# Patient Record
(demographics unavailable — no encounter records)

---

## 2025-01-15 NOTE — HISTORY OF PRESENT ILLNESS
[FreeTextEntry1] : Ms. Bess is an 82-year-old female presenting with right knee pain. She has a chronic history of right knee pain. She has undergone multiple injections, steroid injections along with viscosupplementation shots all with no sustained relief. She only had relief from about 1 week. She is being referred today for genicular nerve blocks. She continues today with severe right knee pain. Pain is made worse with any weight bearing. She cannot stand or walk without severe pain. She has difficulty walking up or down stairs due to the pain. Pain is present daily. She has limitation of her ADLs. She does also have left knee however her pain is in the posterior aspect of the knee. She uses a cane to ambulate. She has limitation of her ADLs.   TODAY, 6-4-2024: Revisit encounter.   Since her last visit, she underwent a Bilateral genicular nerve block on 5-. She notes about 50% sustained relief from this procedure. She states her right side is more severe and has some worsening pain as opposed to the left. She notes pain with any weight bearing activity. She has stiffness along with a constant dull/aching pain. Pain is present daily. She continues to use a walker to ambulate.    TODAY, 6-: Revisit encounter. She is accompanied by her daughter today.   Since her last visit, she is status post second bilateral genicular nerve block on 6-. She continues with 50% sustained relief from this procedure. Her left knee is more improved than her right knee. She only feels some mild stiffness now. She has been able to ambulate better and go up and down the stairs better. Pain is intermittent. She currently rates the pain anywhere from a 2 to a 4/10 on the pain scale.   9-: Revisit encounter. She is accompanied by her daughter today.   She is presenting with ongoing pain in her bilateral knees. To recap, she underwent 2 bilateral genicular nerve blocks with about 50% sustained relief for 2 days. She was able to function better and had decrease in pain. Her pain then returned to baseline after 2 weeks with both procedures. Today, the pain continues to be bothersome. Pain is the most severe around the medial aspect of the knee. Pain is made worse with weight bearing especially when getting in and out of the car or going up and down the stairs. She continues to use a cane to ambulate. Pain is present daily.   1-: Revisit encounter. She is accompanied by her daughter today.   She is presenting today with worsening pain in the right knee. Her pain is in the neck and referring into the shin and into the knee. She has significant tenderness to the touch over the knee. She cannot stand or walk for more than 5 minutes before she has to sit down to ease the pain. She describes the pain as sharp, shooting, stabbing, stiffness, aching and throbbing. She continues to use a walker to ambulate. She rates the pain at an 8/10 on the pain scale. Her pain is present daily. To recap, she underwent a bilateral knee genicular radiofrequency ablation in October which only provided her with 2 months' worth of relief.

## 2025-01-15 NOTE — PHYSICAL EXAM
[de-identified] : R knee  No rashes, erythema, mild edema noted   TTP at medial joint line Stability: no gross instability  ROM: Painful ROM with flexion Gait: cautious, antalgic   L knee  No rashes, erythema, mild edema noted   TTP at medial joint line Stability: no gross instability  ROM: Painful ROM with flexion Gait: cautious, antalgic

## 2025-01-15 NOTE — DISCUSSION/SUMMARY
[de-identified] : A discussion regarding available pain management treatment options occurred with the patient. These included interventional, rehabilitative, pharmacological, and alternative modalities. We will proceed with the following:   Interventional treatment options: - None indicated at this time.   Medications: 1. Ordered Acetaminophen-Codeine 300-30 mg q8h prn for severe pain.   ISTOP Checked Reference # [          ]  The R/B/A of chronic opiate therapy for non-malignant pain including, but not limited to, the risk of addiction, overdose, respiratory depression, and death was discussed and accepted by the patient. Patient was also informed that they should not consume alcohol or drive a motor vehicle under any circumstances while taking opiate pain medications.  The patient reports good pain control with their current medication regimen. They deny any intolerable side effects. There is no obvious aberrant behavior. The patient is more functional with regard to his ADLs and social activity as result of their current medication regimen.   The patient has reviewed and signed an opioid agreement delineating the use of opioid pain medications within our practice. All questions answered to patient's satisfaction.  - Follow up in 4 weeks for medication refill. I will also have her scheduled to see Dr. Byron Yuan to discuss total knee replacement.   I Lali Nicole attest that this documentation has been prepared under the direction and in the presence of provider Dr. Eduardo Garcia.  The documentation recorded by the scribe in my presence, accurately reflects the service I personally performed, and the decisions made by me with my edits as appropriate.   Eduardo Garcia, DO

## 2025-01-15 NOTE — DISCUSSION/SUMMARY
[de-identified] : A discussion regarding available pain management treatment options occurred with the patient. These included interventional, rehabilitative, pharmacological, and alternative modalities. We will proceed with the following:   Interventional treatment options: - None indicated at this time.   Medications: 1. Ordered Acetaminophen-Codeine 300-30 mg q8h prn for severe pain.   ISTOP Checked Reference # [          ]  The R/B/A of chronic opiate therapy for non-malignant pain including, but not limited to, the risk of addiction, overdose, respiratory depression, and death was discussed and accepted by the patient. Patient was also informed that they should not consume alcohol or drive a motor vehicle under any circumstances while taking opiate pain medications.  The patient reports good pain control with their current medication regimen. They deny any intolerable side effects. There is no obvious aberrant behavior. The patient is more functional with regard to his ADLs and social activity as result of their current medication regimen.   The patient has reviewed and signed an opioid agreement delineating the use of opioid pain medications within our practice. All questions answered to patient's satisfaction.  - Follow up in 4 weeks for medication refill. I will also have her scheduled to see Dr. Byron Yuan to discuss total knee replacement.   I Lali Nicole attest that this documentation has been prepared under the direction and in the presence of provider Dr. Eduardo Garcia.  The documentation recorded by the scribe in my presence, accurately reflects the service I personally performed, and the decisions made by me with my edits as appropriate.   Eduardo Garcia, DO

## 2025-01-15 NOTE — PHYSICAL EXAM
[de-identified] : R knee  No rashes, erythema, mild edema noted   TTP at medial joint line Stability: no gross instability  ROM: Painful ROM with flexion Gait: cautious, antalgic   L knee  No rashes, erythema, mild edema noted   TTP at medial joint line Stability: no gross instability  ROM: Painful ROM with flexion Gait: cautious, antalgic

## 2025-03-05 NOTE — PROCEDURE
[Large Joint Injection] : Large joint injection [Right] : of the right [Knee] : knee [Pain] : pain [X-ray evidence of Osteoarthritis on this or prior visit] : x-ray evidence of Osteoarthritis on this or prior visit [Alcohol] : alcohol [Ethyl Chloride sprayed topically] : ethyl chloride sprayed topically [Sterile technique used] : sterile technique used [___ cc    0.5%] : Bupivacaine (Marcaine) ~Vcc of 0.5%  [____] : [unfilled] [] : Patient tolerated procedure well [Call if redness, pain or fever occur] : call if redness, pain or fever occur [Apply ice for 15min out of every hour for the next 12-24 hours as tolerated] : apply ice for 15 minutes out of every hour for the next 12-24 hours as tolerated [Previous OTC use and PT nontherapeutic] : patient has tried OTC's including aspirin, Ibuprofen, Aleve, etc or prescription NSAIDS, and/or exercises at home and/or physical therapy without satisfactory response [Patient had decreased mobility in the joint] : patient had decreased mobility in the joint [Risks, benefits, alternatives discussed / Verbal consent obtained] : the risks benefits, and alternatives have been discussed, and verbal consent was obtained [de-identified] : Chlorhexidine

## 2025-03-05 NOTE — PHYSICAL EXAM
[de-identified] : R knee  No rashes, erythema, mild edema noted   TTP at medial joint line Stability: no gross instability  ROM: Painful ROM with flexion Gait: cautious, antalgic   L knee  No rashes, erythema, mild edema noted   TTP at medial joint line Stability: no gross instability  ROM: Painful ROM with flexion Gait: cautious, antalgic

## 2025-03-05 NOTE — PROCEDURE
[Large Joint Injection] : Large joint injection [Right] : of the right [Knee] : knee [Pain] : pain [X-ray evidence of Osteoarthritis on this or prior visit] : x-ray evidence of Osteoarthritis on this or prior visit [Alcohol] : alcohol [Ethyl Chloride sprayed topically] : ethyl chloride sprayed topically [Sterile technique used] : sterile technique used [___ cc    0.5%] : Bupivacaine (Marcaine) ~Vcc of 0.5%  [____] : [unfilled] [] : Patient tolerated procedure well [Call if redness, pain or fever occur] : call if redness, pain or fever occur [Apply ice for 15min out of every hour for the next 12-24 hours as tolerated] : apply ice for 15 minutes out of every hour for the next 12-24 hours as tolerated [Previous OTC use and PT nontherapeutic] : patient has tried OTC's including aspirin, Ibuprofen, Aleve, etc or prescription NSAIDS, and/or exercises at home and/or physical therapy without satisfactory response [Patient had decreased mobility in the joint] : patient had decreased mobility in the joint [Risks, benefits, alternatives discussed / Verbal consent obtained] : the risks benefits, and alternatives have been discussed, and verbal consent was obtained [de-identified] : Chlorhexidine

## 2025-03-05 NOTE — HISTORY OF PRESENT ILLNESS
[FreeTextEntry1] : Ms. Bess is an 82-year-old female presenting with right knee pain. She has a chronic history of right knee pain. She has undergone multiple injections, steroid injections along with viscosupplementation shots all with no sustained relief. She only had relief from about 1 week. She is being referred today for genicular nerve blocks. She continues today with severe right knee pain. Pain is made worse with any weight bearing. She cannot stand or walk without severe pain. She has difficulty walking up or down stairs due to the pain. Pain is present daily. She has limitation of her ADLs. She does also have left knee however her pain is in the posterior aspect of the knee. She uses a cane to ambulate. She has limitation of her ADLs.   TODAY, 6-4-2024: Revisit encounter.   Since her last visit, she underwent a Bilateral genicular nerve block on 5-. She notes about 50% sustained relief from this procedure. She states her right side is more severe and has some worsening pain as opposed to the left. She notes pain with any weight bearing activity. She has stiffness along with a constant dull/aching pain. Pain is present daily. She continues to use a walker to ambulate.    TODAY, 6-: Revisit encounter. She is accompanied by her daughter today.   Since her last visit, she is status post second bilateral genicular nerve block on 6-. She continues with 50% sustained relief from this procedure. Her left knee is more improved than her right knee. She only feels some mild stiffness now. She has been able to ambulate better and go up and down the stairs better. Pain is intermittent. She currently rates the pain anywhere from a 2 to a 4/10 on the pain scale.   9-: Revisit encounter. She is accompanied by her daughter today.   She is presenting with ongoing pain in her bilateral knees. To recap, she underwent 2 bilateral genicular nerve blocks with about 50% sustained relief for 2 days. She was able to function better and had decrease in pain. Her pain then returned to baseline after 2 weeks with both procedures. Today, the pain continues to be bothersome. Pain is the most severe around the medial aspect of the knee. Pain is made worse with weight bearing especially when getting in and out of the car or going up and down the stairs. She continues to use a cane to ambulate. Pain is present daily.   1-: Revisit encounter. She is accompanied by her daughter today.   She is presenting today with worsening pain in the right knee. Her pain is in the neck and referring into the shin and into the knee. She has significant tenderness to the touch over the knee. She cannot stand or walk for more than 5 minutes before she has to sit down to ease the pain. She describes the pain as sharp, shooting, stabbing, stiffness, aching and throbbing. She continues to use a walker to ambulate. She rates the pain at an 8/10 on the pain scale. Her pain is present daily. To recap, she underwent a bilateral knee genicular radiofrequency ablation in October which only provided her with 2 months' worth of relief.   3-4-2025: Revisit encounter. She is accompanied by her daughter today.   She is presenting today with a flare up of right knee pain. She has been walking around very gingerly as the pain has been worsening. She uses a walker to ambulate. She describes the pain as throbbing, aching, stabbing. She does also note stiffness and crepitus in the knee. Her pain is made worse with any form of weight bearing. Her pain is present daily and rated at an 8/10 on the pain scale.

## 2025-03-05 NOTE — DISCUSSION/SUMMARY
[de-identified] : A discussion regarding available pain management treatment options occurred with the patient. These included interventional, rehabilitative, pharmacological, and alternative modalities. We will proceed with the following:   Interventional treatment options: 1. Done in office today: Bilateral knee steroid injection.  The risks, benefits and alternatives of the proposed procedure were explained in detail with the patient.  The risks outlined include, but are not limited to, infection, bleeding, nerve injury, a temporary increase in pain, failure to resolve symptoms, allergic reaction, and possible elevation of blood sugar in diabetics.  All questions were answered to patient's apparent satisfaction and he/she verbalized an understanding.  - Follow up in 3 months.   I Lali Nicole attest that this documentation has been prepared under the direction and in the presence of provider Dr. Eduardo Garcia.  The documentation recorded by the scribe in my presence, accurately reflects the service I personally performed, and the decisions made by me with my edits as appropriate.   Eduardo Garcia, DO

## 2025-03-05 NOTE — DISCUSSION/SUMMARY
[de-identified] : A discussion regarding available pain management treatment options occurred with the patient. These included interventional, rehabilitative, pharmacological, and alternative modalities. We will proceed with the following:   Interventional treatment options: 1. Done in office today: Bilateral knee steroid injection.  The risks, benefits and alternatives of the proposed procedure were explained in detail with the patient.  The risks outlined include, but are not limited to, infection, bleeding, nerve injury, a temporary increase in pain, failure to resolve symptoms, allergic reaction, and possible elevation of blood sugar in diabetics.  All questions were answered to patient's apparent satisfaction and he/she verbalized an understanding.  - Follow up in 3 months.   I Lali Nicole attest that this documentation has been prepared under the direction and in the presence of provider Dr. Eduardo Garcia.  The documentation recorded by the scribe in my presence, accurately reflects the service I personally performed, and the decisions made by me with my edits as appropriate.   Eduardo Garcia, DO

## 2025-03-05 NOTE — PHYSICAL EXAM
[de-identified] : R knee  No rashes, erythema, mild edema noted   TTP at medial joint line Stability: no gross instability  ROM: Painful ROM with flexion Gait: cautious, antalgic   L knee  No rashes, erythema, mild edema noted   TTP at medial joint line Stability: no gross instability  ROM: Painful ROM with flexion Gait: cautious, antalgic

## 2025-07-22 NOTE — PHYSICAL EXAM
[de-identified] : R knee  No rashes, erythema, mild edema noted   TTP at medial joint line Stability: no gross instability  ROM: Painful ROM with flexion Gait: cautious, antalgic   L knee  No rashes, erythema, mild edema noted   TTP at medial joint line Stability: no gross instability  ROM: Painful ROM with flexion Gait: cautious, antalgic

## 2025-07-22 NOTE — DISCUSSION/SUMMARY
[de-identified] : A discussion regarding available pain management treatment options occurred with the patient. These included interventional, rehabilitative, pharmacological, and alternative modalities. We will proceed with the following:   Interventional treatment options: 1. Done in office today: Bilateral knee steroid injection.  The risks, benefits and alternatives of the proposed procedure were explained in detail with the patient.  The risks outlined include, but are not limited to, infection, bleeding, nerve injury, a temporary increase in pain, failure to resolve symptoms, allergic reaction, and possible elevation of blood sugar in diabetics.  All questions were answered to patient's apparent satisfaction and he/she verbalized an understanding.  - Follow up in 3 months.   I Lali Nicole attest that this documentation has been prepared under the direction and in the presence of provider Dr. Eduardo Garcia.  The documentation recorded by the scribe in my presence, accurately reflects the service I personally performed, and the decisions made by me with my edits as appropriate.   Eduardo Garcia, DO

## 2025-07-22 NOTE — HISTORY OF PRESENT ILLNESS
[FreeTextEntry1] : Ms. Bess is an 82-year-old female presenting with right knee pain. She has a chronic history of right knee pain. She has undergone multiple injections, steroid injections along with viscosupplementation shots all with no sustained relief. She only had relief from about 1 week. She is being referred today for genicular nerve blocks. She continues today with severe right knee pain. Pain is made worse with any weight bearing. She cannot stand or walk without severe pain. She has difficulty walking up or down stairs due to the pain. Pain is present daily. She has limitation of her ADLs. She does also have left knee however her pain is in the posterior aspect of the knee. She uses a cane to ambulate. She has limitation of her ADLs.   TODAY, 6-4-2024: Revisit encounter.   Since her last visit, she underwent a Bilateral genicular nerve block on 5-. She notes about 50% sustained relief from this procedure. She states her right side is more severe and has some worsening pain as opposed to the left. She notes pain with any weight bearing activity. She has stiffness along with a constant dull/aching pain. Pain is present daily. She continues to use a walker to ambulate.    TODAY, 6-: Revisit encounter. She is accompanied by her daughter today.   Since her last visit, she is status post second bilateral genicular nerve block on 6-. She continues with 50% sustained relief from this procedure. Her left knee is more improved than her right knee. She only feels some mild stiffness now. She has been able to ambulate better and go up and down the stairs better. Pain is intermittent. She currently rates the pain anywhere from a 2 to a 4/10 on the pain scale.   9-: Revisit encounter. She is accompanied by her daughter today.   She is presenting with ongoing pain in her bilateral knees. To recap, she underwent 2 bilateral genicular nerve blocks with about 50% sustained relief for 2 days. She was able to function better and had decrease in pain. Her pain then returned to baseline after 2 weeks with both procedures. Today, the pain continues to be bothersome. Pain is the most severe around the medial aspect of the knee. Pain is made worse with weight bearing especially when getting in and out of the car or going up and down the stairs. She continues to use a cane to ambulate. Pain is present daily.   1-: Revisit encounter. She is accompanied by her daughter today.   She is presenting today with worsening pain in the right knee. Her pain is in the neck and referring into the shin and into the knee. She has significant tenderness to the touch over the knee. She cannot stand or walk for more than 5 minutes before she has to sit down to ease the pain. She describes the pain as sharp, shooting, stabbing, stiffness, aching and throbbing. She continues to use a walker to ambulate. She rates the pain at an 8/10 on the pain scale. Her pain is present daily. To recap, she underwent a bilateral knee genicular radiofrequency ablation in October which only provided her with 2 months' worth of relief.   3-4-2025: Revisit encounter. She is accompanied by her daughter today.   She is presenting today with a flare up of right knee pain. She has been walking around very gingerly as the pain has been worsening. She uses a walker to ambulate. She describes the pain as throbbing, aching, stabbing. She does also note stiffness and crepitus in the knee. Her pain is made worse with any form of weight bearing. Her pain is present daily and rated at an 8/10 on the pain scale.   7-: Revisit encounter. She is accompanied by her daughter today.   She is presenting today with a flare up of bilateral knee pain. The pain is around the medial aspect of the knee. The pain is made worse with weight bearing activities, especially when going up and down the stairs. She describes the pain as throbbing, aching, stiffness. Her pain is rated anywhere from a 6 to a 8/10 on the pain scale. She did bang her right knee the other day and developed a bruise and swelling. She denies any fevers/chills. Her pain is present daily and limits her ADLs. She continues to use a walker to ambulate.

## 2025-07-22 NOTE — PROCEDURE
[Large Joint Injection] : Large joint injection [Bilateral] : bilaterally of the [Knee] : knee [Pain] : pain [X-ray evidence of Osteoarthritis on this or prior visit] : x-ray evidence of Osteoarthritis on this or prior visit [Alcohol] : alcohol [Ethyl Chloride sprayed topically] : ethyl chloride sprayed topically [Sterile technique used] : sterile technique used [___ cc    0.5%] : Bupivacaine (Marcaine) ~Vcc of 0.5%  [____] : [unfilled] [] : Patient tolerated procedure well [Call if redness, pain or fever occur] : call if redness, pain or fever occur [Apply ice for 15min out of every hour for the next 12-24 hours as tolerated] : apply ice for 15 minutes out of every hour for the next 12-24 hours as tolerated [Previous OTC use and PT nontherapeutic] : patient has tried OTC's including aspirin, Ibuprofen, Aleve, etc or prescription NSAIDS, and/or exercises at home and/or physical therapy without satisfactory response [Patient had decreased mobility in the joint] : patient had decreased mobility in the joint [Risks, benefits, alternatives discussed / Verbal consent obtained] : the risks benefits, and alternatives have been discussed, and verbal consent was obtained [de-identified] : Chlorhexidine